# Patient Record
Sex: MALE | Race: WHITE | NOT HISPANIC OR LATINO | Employment: STUDENT | URBAN - METROPOLITAN AREA
[De-identification: names, ages, dates, MRNs, and addresses within clinical notes are randomized per-mention and may not be internally consistent; named-entity substitution may affect disease eponyms.]

---
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
## 2025-02-04 ENCOUNTER — HOSPITAL ENCOUNTER (EMERGENCY)
Dept: HOSPITAL 99 - EMR | Age: 20
Discharge: HOME | End: 2025-02-04
Payer: COMMERCIAL

## 2025-02-04 VITALS — SYSTOLIC BLOOD PRESSURE: 108 MMHG | DIASTOLIC BLOOD PRESSURE: 63 MMHG | RESPIRATION RATE: 18 BRPM

## 2025-02-04 VITALS — DIASTOLIC BLOOD PRESSURE: 68 MMHG | SYSTOLIC BLOOD PRESSURE: 98 MMHG | RESPIRATION RATE: 18 BRPM

## 2025-02-04 VITALS — DIASTOLIC BLOOD PRESSURE: 78 MMHG | RESPIRATION RATE: 16 BRPM | SYSTOLIC BLOOD PRESSURE: 119 MMHG

## 2025-02-04 VITALS — BODY MASS INDEX: 23.3 KG/M2

## 2025-02-04 DIAGNOSIS — B34.9: Primary | ICD-10-CM

## 2025-02-04 LAB
ALBUMIN SERPL-MCNC: 4.5 G/DL (ref 3.5–5)
ALP SERPL-CCNC: 60 U/L (ref 38–126)
ALT SERPL-CCNC: 15 U/L (ref 0–50)
AST SERPL-CCNC: 22 U/L (ref 17–59)
BUN SERPL-MCNC: 16 MG/DL (ref 9–20)
CALCIUM SERPL-MCNC: 9.3 MG/DL (ref 8.4–10.2)
CHLORIDE SERPL-SCNC: 97 MMOL/L (ref 98–107)
CO2 SERPL-SCNC: 30 MMOL/L (ref 22–30)
EGFR: > 60
ERYTHROCYTE [DISTWIDTH] IN BLOOD BY AUTOMATED COUNT: 12.8 % (ref 11.5–14.5)
ESTIMATED CREATININE CLEARANCE: 115 ML/MIN
GLUCOSE SERPL-MCNC: 85 MG/DL (ref 70–99)
HCT VFR BLD AUTO: 42.5 % (ref 39–52)
HGB BLD-MCNC: 14.7 G/DL (ref 13–18)
MCHC RBC AUTO-ENTMCNC: 34.6 G/DL (ref 33–37)
MCV RBC AUTO: 85.5 FL (ref 80–94)
NRBC BLD AUTO-RTO: 0 %
PLATELET # BLD AUTO: 198 10^3/UL (ref 130–400)
POTASSIUM SERPL-SCNC: 4.2 MMOL/L (ref 3.5–5.1)
PROT SERPL-MCNC: 7.1 G/DL (ref 6.3–8.2)
SODIUM SERPL-SCNC: 137 MMOL/L (ref 135–145)

## 2025-02-04 PROCEDURE — 96375 TX/PRO/DX INJ NEW DRUG ADDON: CPT

## 2025-02-04 PROCEDURE — 96374 THER/PROPH/DIAG INJ IV PUSH: CPT

## 2025-02-04 PROCEDURE — 96361 HYDRATE IV INFUSION ADD-ON: CPT

## 2025-02-04 PROCEDURE — 99284 EMERGENCY DEPT VISIT MOD MDM: CPT

## 2025-02-04 RX ADMIN — DEXAMETHASONE SODIUM PHOSPHATE 10 MG: 4 INJECTION, SOLUTION INTRA-ARTICULAR; INTRALESIONAL; INTRAMUSCULAR; INTRAVENOUS; SOFT TISSUE at 21:53

## 2025-02-04 RX ADMIN — SODIUM CHLORIDE 1000: 900 INJECTION, SOLUTION INTRAVENOUS at 21:49

## 2025-02-04 RX ADMIN — KETOROLAC TROMETHAMINE 15 MG: 15 INJECTION, SOLUTION INTRAMUSCULAR; INTRAVENOUS at 21:51

## 2025-07-11 ENCOUNTER — HOSPITAL ENCOUNTER (EMERGENCY)
Dept: HOSPITAL 99 - EMR | Age: 20
Discharge: HOME | End: 2025-07-11
Payer: COMMERCIAL

## 2025-07-11 VITALS — DIASTOLIC BLOOD PRESSURE: 77 MMHG | SYSTOLIC BLOOD PRESSURE: 129 MMHG

## 2025-07-11 DIAGNOSIS — B27.90: Primary | ICD-10-CM

## 2025-07-11 DIAGNOSIS — Z90.5: ICD-10-CM

## 2025-07-11 DIAGNOSIS — R59.0: ICD-10-CM

## 2025-07-11 DIAGNOSIS — R42: ICD-10-CM

## 2025-07-11 DIAGNOSIS — Z91.040: ICD-10-CM

## 2025-07-11 LAB
ALBUMIN SERPL-MCNC: 4.3 G/DL (ref 3.5–5)
ALP SERPL-CCNC: 141 U/L (ref 38–126)
ALT SERPL-CCNC: 169 U/L (ref 0–50)
AST SERPL-CCNC: 127 U/L (ref 17–59)
BASOPHILS # BLD AUTO: 0.2 10^3/UL (ref 0–0.2)
BASOPHILS NFR BLD AUTO: 1.2 % (ref 0–2)
BILIRUB SERPL-MCNC: 1.2 MG/DL (ref 0.2–1.3)
BUN SERPL-MCNC: 13 MG/DL (ref 9–20)
CALCIUM SERPL-MCNC: 9.1 MG/DL (ref 8.4–10.2)
CHLORIDE SERPL-SCNC: 103 MMOL/L (ref 98–107)
CO2 SERPL-SCNC: 28 MMOL/L (ref 22–30)
CREAT SERPL-MCNC: 1 MG/DL (ref 0.7–1.3)
EGFR: > 60
EOSINOPHIL # BLD AUTO: 0 10^3/UL (ref 0–0.7)
EOSINOPHIL NFR BLD AUTO: 0.2 % (ref 0–6)
ERYTHROCYTE [DISTWIDTH] IN BLOOD BY AUTOMATED COUNT: 13.2 % (ref 11.5–14.5)
ESTIMATED CREATININE CLEARANCE: (no result) ML/MIN
GLUCOSE SERPL-MCNC: 132 MG/DL (ref 70–99)
HCT VFR BLD AUTO: 42.6 % (ref 39–52)
HGB BLD-MCNC: 14.9 G/DL (ref 13–18)
IMM GRANULOCYTES # BLD AUTO: 0 10^3/UL (ref 0–0.05)
IMM GRANULOCYTES NFR BLD AUTO: 0.1 % (ref 0–0.5)
LYMPHOCYTES # BLD AUTO: 9.8 10^3/UL (ref 1.2–3.4)
LYMPHOCYTES NFR BLD AUTO: 68.6 % (ref 20.5–51.1)
MCH RBC QN AUTO: 28.5 PG (ref 27–31)
MCHC RBC AUTO-ENTMCNC: 35 G/DL (ref 33–37)
MCV RBC AUTO: 81.6 FL (ref 80–94)
MONOCYTES # BLD AUTO: 0.5 10^3/UL (ref 0.1–0.6)
MONOCYTES NFR BLD AUTO: 3.6 % (ref 1.7–9.3)
MONOTEST: POSITIVE
NEUTROPHILS # BLD AUTO: 3.8 10^3/UL (ref 1.4–6.5)
NEUTROPHILS NFR BLD AUTO: 26.3 % (ref 42.2–75.2)
NRBC BLD AUTO-RTO: 0 %
PLATELET # BLD AUTO: 188 10^3/UL (ref 130–400)
PMV BLD AUTO: 9.6 FL (ref 7.4–10.4)
POTASSIUM SERPL-SCNC: 3.8 MMOL/L (ref 3.5–5.1)
PROT SERPL-MCNC: 7.5 G/DL (ref 6.3–8.2)
RBC # BLD AUTO: 5.22 10^6/UL (ref 4.7–6.1)
SODIUM SERPL-SCNC: 136 MMOL/L (ref 135–145)
WBC # BLD AUTO: 14.3 10^3/UL (ref 4.8–10.8)

## 2025-07-11 PROCEDURE — 99284 EMERGENCY DEPT VISIT MOD MDM: CPT

## 2025-07-11 PROCEDURE — 96374 THER/PROPH/DIAG INJ IV PUSH: CPT

## 2025-07-11 PROCEDURE — 96361 HYDRATE IV INFUSION ADD-ON: CPT

## 2025-07-11 RX ADMIN — SODIUM CHLORIDE 1000: 900 INJECTION, SOLUTION INTRAVENOUS at 12:24

## 2025-07-11 RX ADMIN — SODIUM CHLORIDE 1000: 900 INJECTION, SOLUTION INTRAVENOUS at 10:34

## 2025-07-11 RX ADMIN — IBUPROFEN 600 MG: 200 SUSPENSION ORAL at 09:45

## 2025-07-11 RX ADMIN — DEXAMETHASONE SODIUM PHOSPHATE 10 MG: 4 INJECTION, SOLUTION INTRA-ARTICULAR; INTRALESIONAL; INTRAMUSCULAR; INTRAVENOUS; SOFT TISSUE at 10:34

## 2025-07-12 ENCOUNTER — HOSPITAL ENCOUNTER (EMERGENCY)
Dept: HOSPITAL 99 - EMR | Age: 20
Discharge: HOME | End: 2025-07-12
Payer: COMMERCIAL

## 2025-07-12 VITALS — DIASTOLIC BLOOD PRESSURE: 63 MMHG | SYSTOLIC BLOOD PRESSURE: 99 MMHG

## 2025-07-12 DIAGNOSIS — Z91.040: ICD-10-CM

## 2025-07-12 DIAGNOSIS — Z90.5: ICD-10-CM

## 2025-07-12 DIAGNOSIS — J35.1: ICD-10-CM

## 2025-07-12 DIAGNOSIS — B27.90: ICD-10-CM

## 2025-07-12 DIAGNOSIS — R13.10: ICD-10-CM

## 2025-07-12 DIAGNOSIS — J02.8: Primary | ICD-10-CM

## 2025-07-12 LAB — GLUCOSE - POINT OF CARE: 84 MG/DL (ref 70–99)

## 2025-07-12 PROCEDURE — 96361 HYDRATE IV INFUSION ADD-ON: CPT

## 2025-07-12 PROCEDURE — 99284 EMERGENCY DEPT VISIT MOD MDM: CPT

## 2025-07-12 PROCEDURE — 96374 THER/PROPH/DIAG INJ IV PUSH: CPT

## 2025-07-12 RX ADMIN — SODIUM CHLORIDE 1000: 900 INJECTION, SOLUTION INTRAVENOUS at 16:23

## 2025-07-12 RX ADMIN — DEXAMETHASONE SODIUM PHOSPHATE 6 MG: 4 INJECTION, SOLUTION INTRA-ARTICULAR; INTRALESIONAL; INTRAMUSCULAR; INTRAVENOUS; SOFT TISSUE at 16:30
